# Patient Record
Sex: FEMALE | Race: WHITE
[De-identification: names, ages, dates, MRNs, and addresses within clinical notes are randomized per-mention and may not be internally consistent; named-entity substitution may affect disease eponyms.]

---

## 2019-03-07 ENCOUNTER — HOSPITAL ENCOUNTER (OUTPATIENT)
Dept: HOSPITAL 47 - RADCTMAIN | Age: 19
Discharge: HOME | End: 2019-03-07
Attending: FAMILY MEDICINE
Payer: COMMERCIAL

## 2019-03-07 DIAGNOSIS — K76.0: ICD-10-CM

## 2019-03-07 DIAGNOSIS — K42.9: Primary | ICD-10-CM

## 2019-03-07 PROCEDURE — 74177 CT ABD & PELVIS W/CONTRAST: CPT

## 2019-03-07 NOTE — CT
EXAMINATION TYPE: CT abdomen pelvis w con

 

DATE OF EXAM: 3/7/2019

 

COMPARISON: NONE

 

HISTORY: 19-year-old female feels a mass around umbilicus x 1 year. Other intra-abdominal and pelvic 
swelling.

 

TECHNIQUE: Contiguous axial scanning of the abdomen and pelvis following administration of 100 ml Iso
fuad 300 IV contrast.  Delayed images through the kidneys and coronal/sagittal reconstructions perform
ed.

 

CT DLP: 1046 mGycm

Automated exposure control for dose reduction was used.

 

 

FINDINGS:

Heart normal size without pericardial effusion. Lung bases clear without pleural effusion.

 

Liver enlarged measuring 20.8 cm there is marked in diminished attenuation. Portal venous system is p
atent. No biliary ductal dilatation.

 

Gallbladder, adrenal glands, right kidney, spleen with anterior splenule, and pancreas appear within 
normal limits. Extrarenal pelvis left kidney.

 

No dilated small bowel, free fluid, or free air. No mesenteric or retroperitoneal lymphadenopathy.

 

Normal appendix. Oral contrast progressed to the lower descending colon. No pericolonic inflammatory 
change. Moderate stool in the sigmoid colon.

 

Bladder urine distended. Uterus and ovaries are visualized. No abnormal fluid collection in the pelvi
s or pelvic lymphadenopathy.

 

This seems to be an omental fat-containing midline supraumbilical hernia. This is very subtle but sabino
sures 2.4 cm wide and 3.7 cm craniocaudal. There is a pinhole 3 mm neck, referred to axial image 44 a
nd sagittal image 36. No significant inflammatory changes are noted at this time. Some strandy densit
y suspected to represent vessels.

 

Bones: No osseous destructive process.

 

 

 

 

IMPRESSION: 

 

1. OMENTAL FAT-CONTAINING SUPRAUMBILICAL HERNIA. THE HERNIA SAC MEASURES 3.7 X 2.4 CM AND EXTENDS THR
OUGH A PINHOLE 3 MM ABDOMINAL WALL DEFECT.

2. HEPATOMEGALY (20.8 CM) WITH MARKED HEPATIC STEATOSIS.

## 2020-11-13 ENCOUNTER — HOSPITAL ENCOUNTER (OUTPATIENT)
Dept: HOSPITAL 47 - RADCTMAIN | Age: 20
Discharge: HOME | End: 2020-11-13
Attending: FAMILY MEDICINE
Payer: COMMERCIAL

## 2020-11-13 DIAGNOSIS — K76.0: Primary | ICD-10-CM

## 2020-11-13 DIAGNOSIS — R16.0: ICD-10-CM

## 2020-11-13 PROCEDURE — 74160 CT ABDOMEN W/CONTRAST: CPT

## 2020-11-13 NOTE — CT
EXAMINATION TYPE: CT abdomen w con

 

DATE OF EXAM: 11/13/2020

 

COMPARISON: Prior CT 3/7/2019

 

HISTORY: Abd pain

 

CT DLP: 338.5 mGycm

Automated exposure control for dose reduction was used.

 

TECHNIQUE:  Helical acquisition of images was performed from the lung bases through the top of iliac 
crest to include entire abdomen.

 

CONTRAST: 

Performed with Oral Contrast and with IV Contrast, patient injected with 100 mL of Isovue 300.

 

FINDINGS: Probable postop changes noted at the supraumbilical location, linear scar is present

 

LUNG BASES: No significant abnormality is appreciated.

 

LIVER/GB: The liver is enlarged. Liver low attenuation likely due to hepatic steatosis. Gallbladder i
s normal.

 

PANCREAS: No significant abnormality is seen.

 

SPLEEN: No significant abnormality is seen.

 

ADRENALS: No significant abnormality is seen.

 

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

 

 

BOWEL:  No significant abnormality is seen.

 

LYMPH NODES:  No significant abnormality is appreciated.

 

OSSEOUS STRUCTURES:  No significant abnormality is seen.

 

FREE AIR:  No Free Air visible

 

ASCITES:  None visible.

 

RETROPERITONEAL ADENOPATHY:  No Retroperitoneal Adenopathy visible.

 

OTHER:   No abnormal fluid collections 

 

IMPRESSION: 

INTERVAL SURGERY, THERE COULD BE UNDERLYING FAT NECROSIS. HEPATIC STEATOSIS AND HEPATOMEGALY AGAIN NO
TENA.